# Patient Record
(demographics unavailable — no encounter records)

---

## 2024-10-23 NOTE — HISTORY OF PRESENT ILLNESS
[de-identified] : CC: CI  HISTORY OF PRESENT ILLNESS: Mr. Castillo is a pleasant 76 year old gentleman with AU CI uses qtips significant hearing loss denies pain or drainage or vertigo  last seen 5 months ago he had a history of stroke and followed by neurology, who told him to get an audio feels like the ears are clogged using towels to clean his ears  last seen 8 months ago questionable use of qtips left worse than right ear pain some hearing loss  3 mo f/u has ear fullness again  REVIEW OF SYSTEMS:  General ROS: negative for - chills, fatigue or fever Psychological ROS: negative for - anxiety or depression Ophthalmic ROS: negative for - blurry vision, decreased vision or double vision  ENT ROS: negative except as noted from HPI Allergy and Immunology ROS: negative except as noted from HPI Hematological and Lymphatic ROS: negative for - bleeding problems  Endocrine ROS: negative for - malaise/lethargy Respiratory ROS: negative for - stridor Cardiovascular ROS: negative for - chest pain Gastrointestinal ROS: negative for - appetite loss or nausea/vomiting Genitourinary ROS: negative for - incontinence Musculoskeletal ROS: negative for - gait disturbance  Neurological ROS: negative for - behavioral changes Dermatological ROS: negative for - nail changes  Physical Exam:  GENERAL APPEARANCE: Well-developed and No Acute Distress. COMMUNICATION: Able to Communicate. Strong Voice.  HEAD AND FACE Eyes: Testing of ocular motility including primary gaze alignment normal. Inspection and Appearance: No evidence of lesions or masses Palpation: Palpation of the face reveals no sinus tenderness Salivary Glands: Symmetric without masses Facial Strength: Symmetric without evidence of facial paralysis  EAR, NOSE, MOUTH, and THROAT: Ear Canals and Tympanic Membranes, Bilateral: No evidence of inflammation or lesions. Thresholds: Clinical speech reception thresholds normal. External, Nose and Auricle: No lesions or masses.  NECK: Evaluation: No evidence of masses or crepitus. The neck is symmetric and the trachea is in the midline position. Thyroid: No evidence of enlargement, tenderness or mass. Neck Lymph Nodes: WNL. Respiratory: Inspection of the chest including symmetry, expansion and/or assessment of respiratory effort normal. Cardiovascular: Evaluation of peripheral vascular system by observation and palpation of capillary refill, normal. Neurological/Psychiatric: Alert, Oriented, Mood, and Affect Normal.  PROCEDURE: Removal impacted cerumen requiring instrumentation. (10115)  PREOPERATIVE DIAGNOSIS: Cerumen Impaction  POSTOPERATIVE DIAGNOSIS Dx: Same  INDICATION FOR PROCEDURE: Patient has noted fullness and hearing loss in the affected ears for significant period of time.  EXAM FINDINGS: Auditory canal(s) was obstructed with cerumen.  Cerumen was observed with the microscope after the ear speculum was placed in the EAC, and gently loosened with the cerumen loop circumferentially.  The cerumen was then removed using suction, cerumen loop, and irrigation.  The tympanic membranes are intact following the procedure.  Auditory canals appear minimally inflamed.  Left ear: severe CI, removed. TMI Right ear: same as left ear  IMPRESSION: Mr. Castillo is a pleasant 76 year old gentleman with severe CI s/p removal, ear fullness and decreased hearing  PLAN: -RTC q3 months for cleaning  Franklin Guzman MD [facility] Rhinology and Skull Base Surgery Department of Otolaryngology- Head and Neck Surgery [facility]

## 2024-11-12 NOTE — HEALTH RISK ASSESSMENT
[Fair] :  ~his/her~ mood as fair [0] : 2) Feeling down, depressed, or hopeless: Not at all (0) [Never] : Never [None] : None [With Family] : lives with family [Retired] : retired [Graduate School] : graduate school [] :  [Feels Safe at Home] : Feels safe at home [Fully functional (bathing, dressing, toileting, transferring, walking, feeding)] : Fully functional (bathing, dressing, toileting, transferring, walking, feeding) [Reports changes in vision] : Reports changes in vision [With Patient/Caregiver] : , with patient/caregiver [Aggressive treatment] : aggressive treatment [I will adhere to the patient's wishes.] : I will adhere to the patient's wishes. [Time Spent: ___ minutes] : Time Spent: [unfilled] minutes [[id number]] : 0 [Change in mental status noted] : No change in mental status noted [Language] : denies difficulty with language [Behavior] : denies difficulty with behavior [Learning/Retaining New Information] : denies difficulty learning/retaining new information [Handling Complex Tasks] : denies difficulty handling complex tasks [Reasoning] : denies difficulty with reasoning [Spatial Ability and Orientation] : denies difficulty with spatial ability and orientation [Reports changes in hearing] : Reports no changes in hearing [Reports changes in dental health] : Reports no changes in dental health [ColonoscopyDate] : No longer [de-identified] : wife helps with IADLs [de-identified] : will be seeing ophthalmologist in 01/2025 [AdvancecareDate] : 11/12/2024 [FreeTextEntry4] : will discuss among family who to appoint someone as health care proxy.  Full code for now

## 2024-11-12 NOTE — HISTORY OF PRESENT ILLNESS
[FreeTextEntry1] : no new complains.  [de-identified] : 77 yrs old M with pmx of bladder CA, HTN, pre DM, HLD, BPH, CKD stage 3 a, Alzheimer's dementia, trigeminal neuralgia, CVA in 2015 no residual motor weakness comes in for annual exam. No new complains.  Denies any chest pain, sob, palpitations, cough, fevers, abd pain, vomiting, diarrhea, rash, joint pains. eye: will see them 01/2025  sleep- good Mood- good appetite- fine- 2 meals a day    colonoscopy- no longer  Flu- today COVID- 3 doses Hep B MMR varicella Pneumococcal- 2015 shingrix- 2015 Tdap, Td- 2023 AAA- never smoker Lung CA- never smoker PSA- today skin cancer screening- will refer

## 2024-11-12 NOTE — PHYSICAL EXAM
[No Acute Distress] : no acute distress [Normal Sclera/Conjunctiva] : normal sclera/conjunctiva [PERRL] : pupils equal round and reactive to light [EOMI] : extraocular movements intact [Normal Outer Ear/Nose] : the outer ears and nose were normal in appearance [Normal Oropharynx] : the oropharynx was normal [No JVD] : no jugular venous distention [No Lymphadenopathy] : no lymphadenopathy [Supple] : supple [Thyroid Normal, No Nodules] : the thyroid was normal and there were no nodules present [No Respiratory Distress] : no respiratory distress  [No Accessory Muscle Use] : no accessory muscle use [Clear to Auscultation] : lungs were clear to auscultation bilaterally [Normal Rate] : normal rate  [Regular Rhythm] : with a regular rhythm [Normal S1, S2] : normal S1 and S2 [No Murmur] : no murmur heard [No Carotid Bruits] : no carotid bruits [No Abdominal Bruit] : a ~M bruit was not heard ~T in the abdomen [No Varicosities] : no varicosities [Pedal Pulses Present] : the pedal pulses are present [No Edema] : there was no peripheral edema [No Palpable Aorta] : no palpable aorta [No Extremity Clubbing/Cyanosis] : no extremity clubbing/cyanosis [Soft] : abdomen soft [Non Tender] : non-tender [Non-distended] : non-distended [No Masses] : no abdominal mass palpated [No HSM] : no HSM [Normal Bowel Sounds] : normal bowel sounds [Normal Posterior Cervical Nodes] : no posterior cervical lymphadenopathy [Normal Anterior Cervical Nodes] : no anterior cervical lymphadenopathy [No CVA Tenderness] : no CVA  tenderness [No Spinal Tenderness] : no spinal tenderness [No Joint Swelling] : no joint swelling [Grossly Normal Strength/Tone] : grossly normal strength/tone [No Rash] : no rash [Coordination Grossly Intact] : coordination grossly intact [No Focal Deficits] : no focal deficits [Normal Gait] : normal gait [Deep Tendon Reflexes (DTR)] : deep tendon reflexes were 2+ and symmetric [Normal Affect] : the affect was normal [Normal Insight/Judgement] : insight and judgment were intact [de-identified] : uses a walker

## 2025-02-05 NOTE — HISTORY OF PRESENT ILLNESS
[de-identified] : CC: CI  HISTORY OF PRESENT ILLNESS: Mr. Castillo is a pleasant 76 year old gentleman with AU CI uses qtips significant hearing loss denies pain or drainage or vertigo  last seen 5 months ago he had a history of stroke and followed by neurology, who told him to get an audio feels like the ears are clogged using towels to clean his ears  last seen 8 months ago questionable use of qtips left worse than right ear pain some hearing loss  3 mo f/u has ear fullness again  4 mo f/u aural fullness and decreased hearing  REVIEW OF SYSTEMS:  General ROS: negative for - chills, fatigue or fever Psychological ROS: negative for - anxiety or depression Ophthalmic ROS: negative for - blurry vision, decreased vision or double vision  ENT ROS: negative except as noted from HPI Allergy and Immunology ROS: negative except as noted from HPI Hematological and Lymphatic ROS: negative for - bleeding problems  Endocrine ROS: negative for - malaise/lethargy Respiratory ROS: negative for - stridor Cardiovascular ROS: negative for - chest pain Gastrointestinal ROS: negative for - appetite loss or nausea/vomiting Genitourinary ROS: negative for - incontinence Musculoskeletal ROS: negative for - gait disturbance  Neurological ROS: negative for - behavioral changes Dermatological ROS: negative for - nail changes  Physical Exam:  GENERAL APPEARANCE: Well-developed and No Acute Distress. COMMUNICATION: Able to Communicate. Strong Voice.  HEAD AND FACE Eyes: Testing of ocular motility including primary gaze alignment normal. Inspection and Appearance: No evidence of lesions or masses Palpation: Palpation of the face reveals no sinus tenderness Salivary Glands: Symmetric without masses Facial Strength: Symmetric without evidence of facial paralysis  EAR, NOSE, MOUTH, and THROAT: Ear Canals and Tympanic Membranes, Bilateral: No evidence of inflammation or lesions. Thresholds: Clinical speech reception thresholds normal. External, Nose and Auricle: No lesions or masses.  NECK: Evaluation: No evidence of masses or crepitus. The neck is symmetric and the trachea is in the midline position. Thyroid: No evidence of enlargement, tenderness or mass. Neck Lymph Nodes: WNL. Respiratory: Inspection of the chest including symmetry, expansion and/or assessment of respiratory effort normal. Cardiovascular: Evaluation of peripheral vascular system by observation and palpation of capillary refill, normal. Neurological/Psychiatric: Alert, Oriented, Mood, and Affect Normal.  PROCEDURE: Removal impacted cerumen requiring instrumentation. (27119)  PREOPERATIVE DIAGNOSIS: Cerumen Impaction  POSTOPERATIVE DIAGNOSIS Dx: Same  INDICATION FOR PROCEDURE: Patient has noted fullness and hearing loss in the affected ears for significant period of time.  EXAM FINDINGS: Auditory canal(s) was obstructed with cerumen.  Cerumen was observed with the microscope after the ear speculum was placed in the EAC, and gently loosened with the cerumen loop circumferentially.  The cerumen was then removed using suction, cerumen loop, and irrigation.  The tympanic membranes are intact following the procedure.  Auditory canals appear minimally inflamed.  Left ear: severe CI, removed. TMI Right ear: same as left ear  IMPRESSION: Mr. Castillo is a pleasant 76 year old gentleman with severe CI s/p removal, ear fullness and decreased hearing resolved  PLAN: -RTC q3 months for cleaning  Franklin Guzman MD [facility] Rhinology and Skull Base Surgery Department of Otolaryngology- Head and Neck Surgery [facility]

## 2025-02-05 NOTE — HISTORY OF PRESENT ILLNESS
[de-identified] : CC: CI  HISTORY OF PRESENT ILLNESS: Mr. Castillo is a pleasant 76 year old gentleman with AU CI uses qtips significant hearing loss denies pain or drainage or vertigo  last seen 5 months ago he had a history of stroke and followed by neurology, who told him to get an audio feels like the ears are clogged using towels to clean his ears  last seen 8 months ago questionable use of qtips left worse than right ear pain some hearing loss  3 mo f/u has ear fullness again  4 mo f/u aural fullness and decreased hearing  REVIEW OF SYSTEMS:  General ROS: negative for - chills, fatigue or fever Psychological ROS: negative for - anxiety or depression Ophthalmic ROS: negative for - blurry vision, decreased vision or double vision  ENT ROS: negative except as noted from HPI Allergy and Immunology ROS: negative except as noted from HPI Hematological and Lymphatic ROS: negative for - bleeding problems  Endocrine ROS: negative for - malaise/lethargy Respiratory ROS: negative for - stridor Cardiovascular ROS: negative for - chest pain Gastrointestinal ROS: negative for - appetite loss or nausea/vomiting Genitourinary ROS: negative for - incontinence Musculoskeletal ROS: negative for - gait disturbance  Neurological ROS: negative for - behavioral changes Dermatological ROS: negative for - nail changes  Physical Exam:  GENERAL APPEARANCE: Well-developed and No Acute Distress. COMMUNICATION: Able to Communicate. Strong Voice.  HEAD AND FACE Eyes: Testing of ocular motility including primary gaze alignment normal. Inspection and Appearance: No evidence of lesions or masses Palpation: Palpation of the face reveals no sinus tenderness Salivary Glands: Symmetric without masses Facial Strength: Symmetric without evidence of facial paralysis  EAR, NOSE, MOUTH, and THROAT: Ear Canals and Tympanic Membranes, Bilateral: No evidence of inflammation or lesions. Thresholds: Clinical speech reception thresholds normal. External, Nose and Auricle: No lesions or masses.  NECK: Evaluation: No evidence of masses or crepitus. The neck is symmetric and the trachea is in the midline position. Thyroid: No evidence of enlargement, tenderness or mass. Neck Lymph Nodes: WNL. Respiratory: Inspection of the chest including symmetry, expansion and/or assessment of respiratory effort normal. Cardiovascular: Evaluation of peripheral vascular system by observation and palpation of capillary refill, normal. Neurological/Psychiatric: Alert, Oriented, Mood, and Affect Normal.  PROCEDURE: Removal impacted cerumen requiring instrumentation. (56555)  PREOPERATIVE DIAGNOSIS: Cerumen Impaction  POSTOPERATIVE DIAGNOSIS Dx: Same  INDICATION FOR PROCEDURE: Patient has noted fullness and hearing loss in the affected ears for significant period of time.  EXAM FINDINGS: Auditory canal(s) was obstructed with cerumen.  Cerumen was observed with the microscope after the ear speculum was placed in the EAC, and gently loosened with the cerumen loop circumferentially.  The cerumen was then removed using suction, cerumen loop, and irrigation.  The tympanic membranes are intact following the procedure.  Auditory canals appear minimally inflamed.  Left ear: severe CI, removed. TMI Right ear: same as left ear  IMPRESSION: Mr. Castillo is a pleasant 76 year old gentleman with severe CI s/p removal, ear fullness and decreased hearing resolved  PLAN: -RTC q3 months for cleaning  Franklin Guzman MD [facility] Rhinology and Skull Base Surgery Department of Otolaryngology- Head and Neck Surgery [facility]

## 2025-02-09 NOTE — HISTORY OF PRESENT ILLNESS
[FreeTextEntry1] : 1/15/21: 74 year old man with history of TIA, BPH on flomax, elevated PSA with no prior biopsy, microscopic hematuria with distant negative workup who presents as a new patient for elevated PSA and gross hematuria.  With regards to elevated PSA (6.7 1/7/21, 6.4 9/29/20, 6.78 1/10/20 and 6.0 1/25/19), patient has not had prior prostate MRI or biopsy. He does not have significant voiding symptoms. He denies frequency, urgency, dysuria, hesitancy, incontinence, weak stream, incomplete emptying since starting flomax. He denies bone pain or unintentional weigh loss. No family history of prostate cancer.  With regards to hematuria, patient has had microscopic hematuria for some time and believes Dr. Cloud previously performed cystoscopy. He reports new gross hematuria. It is intermittent in nature. No fevers, chills or flank pain. No blood clots.  1/29/21: Patient presents today for cystoscopy and discussion of findings. He had CT urogram performed that identified bladder tumors prior to cystoscopy. No upper tract disease. He also had prostate MRI for elevated PSA that identified a 100 gram prostate without targetable lesion. PSA density 0.06. He continues to have gross hematuria without retention. His PVR today was 330 cc though he feels he is voiding well  2/22/21: Patient had TURBT on 2/18/21. Presents for void trial. No major issues post-op. Mild hematuria resolved. No fevers or chills.  3/1/21: Patient failed prior void trial. Presents for repeat void trial today. No changes to health in the interim.  Pathology result: Ta High grade, small amount of muscle present on specimen  4/9/21: Patient presents to begin BCG treatment. He reports no new urinary symptoms, fevers, chills, hematuria. Doing well.  7/9/21: Had negative cystoscopy 6/11/21, presents to begin maintenance BCG 1/3. No new urinary symptoms, fevers, chills, hematuria.  10/13/21: Had negative cystoscopy 9/10/21, presents to begin maintenance BCG 1/3. No new urinary symptoms, fevers, chills, hematuria. Has ongoing weight loss and decreased appetite of unclear etiology, unlikely to be related to non-invasive bladder cancer.  1/12/22: Had negative cystoscopy 12/8/21, presents to begin maintenance BCG 1/3. No new urinary symptoms, fevers, chills, hematuria.  7/13/22: Had negative cystoscopy 3/9/22 and 6/8/22. Presents to begin maintenance BCG 1/3. No new symptoms, fevers, chills or hematuria. He does report ED. He has taken a medication for this without success. He is unsure which medication though.  7/27/22: Presents for BCG 3/3. UA nitrite positive. Will postpone and send urine for culture.  8/9/24: Here for f/u and cysto. Developed UTI after last cystoscopy, treated with oral antibiotics. Doing well currently with no hematuria or change in LUTS.  Cystoscopy today was normal  2/10/25: recent non-febrile UTI last month, urine was cloudy.   Cystoscopy today

## 2025-02-09 NOTE — ASSESSMENT
[FreeTextEntry1] : Patient with 5 cm Ta high grade tumor on initial TURBT 2/18/21. Pathology consistent with high risk disease.  He completed induction BCG x 6 weeks without issue, and maintenance BCG x 2 (7/23/21, 10/27/21, 1/26/2022, 2 out of 3 weeks on 7/20/22), negative cystoscopy 9/2021, 12/2021, 3/2022, 6/2022, 9/2022, 12/2022, 4/2023, 8/2023, 12/2023, 4/2024, 8/2024; negative cytology 9/2021 and 12/2021, 3/2022, 6/2022, 9/2022, 12/2022, 4/2023, 8/2023, 12/2023, 4/2024. No tumor seen on cystoscopy today.  - Urine cytology, UA, UCx  - Cipro for cystoscopy given prior UTI after cystoscopy  - F/U in 6 months for cystoscopy.

## 2025-02-09 NOTE — PHYSICAL EXAM
[Normal Appearance] : normal appearance [Well Groomed] : well groomed [General Appearance - In No Acute Distress] : no acute distress [Edema] : no peripheral edema [Respiration, Rhythm And Depth] : normal respiratory rhythm and effort [Exaggerated Use Of Accessory Muscles For Inspiration] : no accessory muscle use [Abdomen Soft] : soft [Abdomen Tenderness] : non-tender [Costovertebral Angle Tenderness] : no ~M costovertebral angle tenderness [Urethral Meatus] : meatus normal [Urinary Bladder Findings] : the bladder was normal on palpation [Normal Station and Gait] : the gait and station were normal for the patient's age [] : no rash [No Focal Deficits] : no focal deficits [Oriented To Time, Place, And Person] : oriented to person, place, and time [Affect] : the affect was normal [Mood] : the mood was normal

## 2025-02-09 NOTE — LETTER BODY
[Dear  ___] : Dear  [unfilled], [Courtesy Letter:] : I had the pleasure of seeing your patient, [unfilled], in my office today. [Please see my note below.] : Please see my note below. [Consult Closing:] : Thank you very much for allowing me to participate in the care of this patient.  If you have any questions, please do not hesitate to contact me. [Sincerely,] : Sincerely, [FreeTextEntry3] : Zuri German MD

## 2025-03-26 NOTE — REASON FOR VISIT
[Symptom and Test Evaluation] : symptom and test evaluation [FreeTextEntry1] : 78 year old man with history of HTN, HLD and CVA comes in for a visit. No chest pain. No dyspnea. Occasional fatigue noted.  Poor historian.

## 2025-03-26 NOTE — DISCUSSION/SUMMARY
[FreeTextEntry1] : SOB echo reviewed. Inclined towards a conservative follow up in this patient. We had a careful discussion regarding diet and exercise. Will be happy to re-evaluate. HTN - ALAN  and AMBIKA had an extensive discussion regarding his blood pressure management. Patient will continue taking current medications in addition to maintaining a low Na diet, with periodic b/p checks at home check renal us if able to approve and schedule. Carotid disease minor disease noted on exam; will manage medically with current medications and re-evaluate lipids in 3-6 months HLD ALAN and AMBIKA discussed his lipid panel and individualized target LDL goal. At this point, will do diet and exercise with anticipation of re-evaluating labs in 3-6 months

## 2025-05-13 NOTE — ASSESSMENT
[FreeTextEntry1] : #Skin cancer screening  Sun protection reviewed. The patient was educated regarding appropriate sun protection measures, including wearing sunscreen with SPF 30 or higher when outdoors, sun protective clothing, and sun avoidance.   #Cherry angioma #Benign appearing nevi #Solar lentigines #Seborrheic keratosis Patient was reassured of the benign nature of these findings. No further treatment needed at this time. If any lesion changes or becomes symptomatic I recommend follow-up  # Actinic keratosis - 2 scalp   Provided anticipatory guidance and education regarding these lesions.  Given there is a risk of malignancy without treatment, I would recommend treatment with cryotherapy.   Cryotherapy Procedure Note.   After obtaining verbal consent, including counseling on risks of dyspigmentation and scarring, liquid nitrogen was applied to the above lesions. The patient tolerated the procedure well.  Wound care was reviewed.  #Solar purpura- forearms #Ecchymosis - R posterior thigh benign, continue to monitor advised follow up if individual lesions do not resolve after few weeks or if they continue to worsen   RTC yearly or sooner prn

## 2025-05-13 NOTE — HISTORY OF PRESENT ILLNESS
[FreeTextEntry1] : NPV - FBSE [de-identified] : Lenin Castillo 79y/o M presents for FBSE. With wife Kendra who also has appt today. Pt w Alzheimer's. Wife assists as historian.   PHx of skin cancer: no FHx of skin cancer: no H/x of blistering sunburns: no  H/x of tanning bed use: no Uses sunscreen regularly: no

## 2025-05-13 NOTE — PHYSICAL EXAM
[FreeTextEntry3] : Exam of external genitalia was deferred.  -On the trunk and upper/lower extremities bilaterally, are multiple scattered tan to brown macules and papules with regular borders. Some of these were examined dermoscopically and had reassuring features. -Scattered tan smooth macules with regular borders and pigmentation. Many of these were examined with dermoscopy and had benign features. -Scattered on the trunk and extremities are several cherry red papules. -Scattered tan waxy/keratotic, stuck-on appearing papules/plaques with pseudohorn cysts.  With dermoscopy, milia-like cysts and comedo-like openings are noted. - vertex scalp: pink scaly non indurated macules -R forearm, L posterior thigh: non-palpable ecchymotic purpuric patches

## 2025-05-13 NOTE — HISTORY OF PRESENT ILLNESS
[FreeTextEntry1] : NPV - FBSE [de-identified] : Lenin Castillo 77y/o M presents for FBSE. With wife Kendra who also has appt today. Pt w Alzheimer's. Wife assists as historian.   PHx of skin cancer: no FHx of skin cancer: no H/x of blistering sunburns: no  H/x of tanning bed use: no Uses sunscreen regularly: no

## 2025-06-18 NOTE — ASSESSMENT
[FreeTextEntry1] : Poor historian: A&O *2 ( not to date)  77 yrs old M with pmx of bladder CA, HTN, pre DM, HLD, BPH, CKD stage 3 a, Alzheimer's dementia, trigeminal neuralgia, CVA in 2015 no residual motor weakness comes in for follow up.   Transaminitis has now resolved on new labs last hb 12.9 05/2025  # HTN BP at goal taking amlodipine- benazepril 10-40 mg daily, chlorthalidone 25 mg OD, metoprolol 50 mg OD no new complains.  Denies any chest pain, sob, palpitations, cough, fevers, abd pain, vomiting, diarrhea, rash, dysuria, joint pains. eye- 02/2025  Plan: cont same  BP log at home DASH diet RTC in 6 months.   # prediabetes: not on any meds  Plan: repeat labs advised on diet.

## 2025-06-18 NOTE — PHYSICAL EXAM
[No Acute Distress] : no acute distress [Normal Sclera/Conjunctiva] : normal sclera/conjunctiva [PERRL] : pupils equal round and reactive to light [EOMI] : extraocular movements intact [Normal Outer Ear/Nose] : the outer ears and nose were normal in appearance [Normal Oropharynx] : the oropharynx was normal [No JVD] : no jugular venous distention [No Lymphadenopathy] : no lymphadenopathy [Supple] : supple [Thyroid Normal, No Nodules] : the thyroid was normal and there were no nodules present [No Respiratory Distress] : no respiratory distress  [No Accessory Muscle Use] : no accessory muscle use [Clear to Auscultation] : lungs were clear to auscultation bilaterally [Normal Rate] : normal rate  [Regular Rhythm] : with a regular rhythm [Normal S1, S2] : normal S1 and S2 [No Murmur] : no murmur heard [No Carotid Bruits] : no carotid bruits [No Abdominal Bruit] : a ~M bruit was not heard ~T in the abdomen [No Varicosities] : no varicosities [Pedal Pulses Present] : the pedal pulses are present [No Edema] : there was no peripheral edema [No Palpable Aorta] : no palpable aorta [No Extremity Clubbing/Cyanosis] : no extremity clubbing/cyanosis [Soft] : abdomen soft [Non Tender] : non-tender [Non-distended] : non-distended [Normal Posterior Cervical Nodes] : no posterior cervical lymphadenopathy [Normal Anterior Cervical Nodes] : no anterior cervical lymphadenopathy [No CVA Tenderness] : no CVA  tenderness [No Spinal Tenderness] : no spinal tenderness [No Joint Swelling] : no joint swelling [Grossly Normal Strength/Tone] : grossly normal strength/tone [No Rash] : no rash [Coordination Grossly Intact] : coordination grossly intact [No Focal Deficits] : no focal deficits [Normal Gait] : normal gait [Deep Tendon Reflexes (DTR)] : deep tendon reflexes were 2+ and symmetric [Normal Affect] : the affect was normal [Normal Insight/Judgement] : insight and judgment were intact

## 2025-06-18 NOTE — HISTORY OF PRESENT ILLNESS
[FreeTextEntry1] : no new complains.  [de-identified] : Poor historian: A&O *2 ( not to date)  77 yrs old M with pmx of bladder CA, HTN, pre DM, HLD, BPH, CKD stage 3 a, Alzheimer's dementia, trigeminal neuralgia, CVA in 2015 no residual motor weakness comes in for follow up.  no new complains.  Denies any chest pain, sob, palpitations, cough, fevers, abd pain, vomiting, diarrhea, rash, dysuria, joint pains.

## 2025-06-25 NOTE — HISTORY OF PRESENT ILLNESS
[de-identified] : CC: aural fullness  HISTORY OF PRESENT ILLNESS: Mr. Castillo is a pleasant 76 year old gentleman with AU CI uses qtips significant hearing loss denies pain or drainage or vertigo  last seen 5 months ago he had a history of stroke and followed by neurology, who told him to get an audio feels like the ears are clogged using towels to clean his ears  last seen 8 months ago questionable use of qtips left worse than right ear pain some hearing loss  3 mo f/u has ear fullness again  4 mo f/u aural fullness and decreased hearing  3 mo f/u reports aural fullness  REVIEW OF SYSTEMS:  General ROS: negative for - chills, fatigue or fever Psychological ROS: negative for - anxiety or depression Ophthalmic ROS: negative for - blurry vision, decreased vision or double vision  ENT ROS: negative except as noted from HPI Allergy and Immunology ROS: negative except as noted from HPI Hematological and Lymphatic ROS: negative for - bleeding problems  Endocrine ROS: negative for - malaise/lethargy Respiratory ROS: negative for - stridor Cardiovascular ROS: negative for - chest pain Gastrointestinal ROS: negative for - appetite loss or nausea/vomiting Genitourinary ROS: negative for - incontinence Musculoskeletal ROS: negative for - gait disturbance  Neurological ROS: negative for - behavioral changes Dermatological ROS: negative for - nail changes  Physical Exam:  GENERAL APPEARANCE: Well-developed and No Acute Distress. COMMUNICATION: Able to Communicate. Strong Voice.  HEAD AND FACE Eyes: Testing of ocular motility including primary gaze alignment normal. Inspection and Appearance: No evidence of lesions or masses Palpation: Palpation of the face reveals no sinus tenderness Salivary Glands: Symmetric without masses Facial Strength: Symmetric without evidence of facial paralysis  EAR, NOSE, MOUTH, and THROAT: Ear Canals and Tympanic Membranes, Bilateral: No evidence of inflammation or lesions. Thresholds: Clinical speech reception thresholds normal. External, Nose and Auricle: No lesions or masses.  NECK: Evaluation: No evidence of masses or crepitus. The neck is symmetric and the trachea is in the midline position. Thyroid: No evidence of enlargement, tenderness or mass. Neck Lymph Nodes: WNL. Respiratory: Inspection of the chest including symmetry, expansion and/or assessment of respiratory effort normal. Cardiovascular: Evaluation of peripheral vascular system by observation and palpation of capillary refill, normal. Neurological/Psychiatric: Alert, Oriented, Mood, and Affect Normal.  PROCEDURE: Removal impacted cerumen requiring instrumentation. ([id number])  PREOPERATIVE DIAGNOSIS: Cerumen Impaction  POSTOPERATIVE DIAGNOSIS Dx: Same  INDICATION FOR PROCEDURE: Patient has noted fullness and hearing loss in the affected ears for significant period of time.  EXAM FINDINGS: Auditory canal(s) was obstructed with cerumen.  Cerumen was observed with the microscope after the ear speculum was placed in the EAC, and gently loosened with the cerumen loop circumferentially.  The cerumen was then removed using suction, cerumen loop, and irrigation.  The tympanic membranes are intact following the procedure.  Auditory canals appear minimally inflamed.  Left ear: severe CI, removed. TMI Right ear: same as left ear  IMPRESSION: Mr. Castillo is a pleasant 76 year old gentleman with severe CI s/p removal, ear fullness and decreased hearing resolved  PLAN: -RTC q3 months for cleaning  MD ROBERTO CARLOS [facility] Department of Otolaryngology- Head and Neck Surgery [facility]